# Patient Record
Sex: FEMALE | URBAN - METROPOLITAN AREA
[De-identification: names, ages, dates, MRNs, and addresses within clinical notes are randomized per-mention and may not be internally consistent; named-entity substitution may affect disease eponyms.]

---

## 2021-03-24 ENCOUNTER — HISTORICAL (OUTPATIENT)
Dept: ADMINISTRATIVE | Facility: HOSPITAL | Age: 81
End: 2021-03-24

## 2022-04-30 NOTE — OP NOTE
Patient:   Yoko Munoz            MRN: [id number]            FIN: [id number]               Age:   80 years     Sex:  Female     :  1940   Associated Diagnoses:   None   Author:   Daniele Hernandez MD      Preoperative diagnosis: Cataract of the right  eye     Postoperative diagnosis: Same     Operative procedure: Cataract extraction with intraocular lens implant    Lens Style: [id number]    The patient was brought to the operating theater by wheelchair and under light sedation given topical anesthesia using 0.75% Marcaine.  After adequate anesthesia the patient was then prepped and draped in usual ophthalmological manner.   Sadia lid speculums were applied and under the surgical microscope a keratome incision was made temporally using a mirella keratome blade and a 15° mirella keratome stab incision was made in the superior nasal quadrant.  Viscoat was instilled into the anterior chamber and an anterior capsulorrhexis was performed using a bent 25-gauge needle and the anterior capsule flap withdrawn with Kelman forceps. Using an irrigating Kelman cystotome the nucleus was irrigated and rocked free from the cortical bed and the handpiece was withdrawn. The phacoemulsification handpiece was introduced into the eye and phacoemulsification carried out the posterior chamber and the iris plane while a nucleus manipulator was used to direct the nucleus fragments  towards the phacoemulsification tip and prevent corneal contact. After phacoemulsification of the nucleus was completed the handpiece was withdrawn and an irrigation-aspiration handpiece was introduced into the eye and all visible cortical fragments were aspirated from the eye without difficulty. The handpiece was withdrawn and Healon was introduced into the eye to inflate the anterior chamber and the capsular bag.  An intraocular lens implant was selected, inspected, folded and placed into the lens injector and then the lens carefully injected  into the capsular bag while the haptics were positioned using the nucleus manipulator. The Healon was then aspirated from the eye using an irrigation-aspiration handpiece and the handpiece withdrawn from the eye. The anterior chamber was inflated with balanced salt solution and the wound checked for water tightness and found to be intact.  The antibiotic drops used preoperatively were instilled into the eye and the patient sent to the outpatient recovery in good condition.

## 2024-05-30 DIAGNOSIS — R13.10 DYSPHAGIA: ICD-10-CM

## 2024-05-30 DIAGNOSIS — K20.90 ESOPHAGITIS: ICD-10-CM

## 2024-05-30 DIAGNOSIS — T17.308A CHOKING: ICD-10-CM

## 2024-05-30 DIAGNOSIS — K44.9 DIAPHRAGMATIC HERNIA WITHOUT OBSTRUCTION OR GANGRENE: Primary | ICD-10-CM

## 2024-05-30 DIAGNOSIS — K21.9 GASTROESOPHAGEAL REFLUX DISEASE: ICD-10-CM

## 2024-07-12 ENCOUNTER — HOSPITAL ENCOUNTER (OUTPATIENT)
Dept: RADIOLOGY | Facility: HOSPITAL | Age: 84
Discharge: HOME | End: 2024-07-12
Attending: NURSE PRACTITIONER
Payer: MEDICARE

## 2024-07-12 ENCOUNTER — CLINICAL SUPPORT (OUTPATIENT)
Dept: REHABILITATION | Facility: HOSPITAL | Age: 84
End: 2024-07-12
Attending: NURSE PRACTITIONER
Payer: MEDICARE

## 2024-07-12 DIAGNOSIS — K44.9 DIAPHRAGMATIC HERNIA WITHOUT OBSTRUCTION OR GANGRENE: ICD-10-CM

## 2024-07-12 DIAGNOSIS — T17.308A CHOKING: ICD-10-CM

## 2024-07-12 DIAGNOSIS — K21.9 GASTROESOPHAGEAL REFLUX DISEASE: ICD-10-CM

## 2024-07-12 DIAGNOSIS — R13.10 DYSPHAGIA: ICD-10-CM

## 2024-07-12 DIAGNOSIS — K20.90 ESOPHAGITIS: ICD-10-CM

## 2024-07-12 PROCEDURE — 25500020 PHARM REV CODE 255: Performed by: NURSE PRACTITIONER

## 2024-07-12 PROCEDURE — A9698 NON-RAD CONTRAST MATERIALNOC: HCPCS | Performed by: NURSE PRACTITIONER

## 2024-07-12 PROCEDURE — 92611 MOTION FLUOROSCOPY/SWALLOW: CPT

## 2024-07-12 PROCEDURE — 74230 X-RAY XM SWLNG FUNCJ C+: CPT | Mod: TC

## 2024-07-12 RX ADMIN — BARIUM SULFATE 10 ML: 0.81 POWDER, FOR SUSPENSION ORAL at 08:07

## 2024-07-12 NOTE — PROGRESS NOTES
[facility]  Speech Language Pathology Department  Outpatient Modified Barium Swallow Study    Patient Name:  Yoko Munoz   MRN:  [id number]    Recommendations     General recommendations:  no SLP intervention indicated  Repeat MBS study: not warranted  Diet texture/consistency recommendations: Regular solids (IDDSI 7) and thin liquids (IDDSI 0)  Medications: per patient preference  Swallow strategies/precautions: small bites/sips, slow rate, and upright for PO intake    History/Reason for Referral     Yoko Munoz is an 83 y.o. female referred by VINCENT Murray for a Modified Barium Swallow Study due to c/o reflux and discomfort in chest with swallowing.    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    Home diet texture/consistency: Regular and thin liquids  Current Method of Nutrition: PO diet (same as above)    Subjective     Patient awake, alert, and cooperative.  Spiritual/Cultural/Episcopal Beliefs/Practices that affect care: no    Pain/Comfort: 0/10    Respiratory Status:  room air    Radiologist: Dari Flores MD    Fluoroscopic Findings     Oral Musculature  Dentition: own teeth  Secretion Management: adequate  Mucosal Quality: good  Facial Movement: WFL  Buccal Strength & Mobility: WFL  Mandibular Strength & Mobility: WFL  Oral Labial Strength & Mobility: WFL  Lingual Strength & Mobility: WFL  Velar Elevation: WFL  Vocal Quality: adequate    Setup  Seated in straight chair  Able to self feed  Adequate head control    Visualization  Lateral view    Oral Phase:   Adequate lip closure  Adequate bolus formation  Adequate mastication  Adequate bolus cohesion  Adequate anterior-posterior transport    Pharyngeal Phase:   Timely swallow reflex  Adequate base of tongue retraction  Reduced epiglottic deflection  Reduced hyolaryngeal excursion  Adequate airway protection    Consistency Laryngeal Penetration  Aspiration Residue   Thin liquid by cup During the swallow  Cleared spontaneously None None   Thin liquid by straw During the swallow  Cleared spontaneously None None   Puree None None Trace   Chewable solid None None Trace     Cervical Esophageal Phase:   UES appeared to accommodate all bolus types without stasis or retrograde movement visualized      Assessment     Oropharyngeal swallow WFL with no aspiration visualized during this study     Patient appears to be at low risk for aspiration related pneumonia.    Patient Education     Patient provided with verbal education regarding MBS results.  Understanding was verbalized.    Time Tracking     SLP Treatment Date: 7/12/24  Speech Start Time:  0830  Speech Stop Time:  0845     Speech Total Time (min):  15    Billable minutes:   Motion Fluoroscopic Evaluation, Video Recording, 15 minutes     07/12/2024   [FreeTextEntry1] : cpx [de-identified] : home bps were running 120's\par taking amlodipine 10 (am) and crestor 5\par not on cpap--denies overt sleep apnea symps\par wt gain noted\par 3x covid vacced